# Patient Record
Sex: MALE | Race: WHITE
[De-identification: names, ages, dates, MRNs, and addresses within clinical notes are randomized per-mention and may not be internally consistent; named-entity substitution may affect disease eponyms.]

---

## 2021-03-01 ENCOUNTER — HOSPITAL ENCOUNTER (OUTPATIENT)
Dept: HOSPITAL 61 - KCIC MRI | Age: 61
End: 2021-03-01
Payer: COMMERCIAL

## 2021-03-01 DIAGNOSIS — M47.816: Primary | ICD-10-CM

## 2021-03-01 PROCEDURE — 72148 MRI LUMBAR SPINE W/O DYE: CPT

## 2021-03-01 NOTE — [facility]
EXAMINATION: Magnetic resonance imaging (MRI) of the lumbar spine without contrast 3/1/2021 8:02 AM



HISTORY: Lumbar pain and strain. Low back pain radiating upwards for one month.



TECHNIQUE: Multiplanar multi-weighted MRI of the lumbar spine was performed without intravenous contr
ast using the standard lumbar spine protocol.



Contrast information:

None administered.



COMPARISON: None available.



FINDINGS:



The alignment of the lumbar spine is normal.  There is a L4 osseous hemangioma. Vertebral bodies demo
nstrate normal signal intensity on all sequences.  There are no compression fractures.  The conus med
ullaris terminates at the level of L1. The distal spinal cord signal intensity is normal.  Interverte
bral disks have normal height and signal intensity. There are no annular fissures identified.  Limite
d views of the abdomen and pelvis show no soft tissue abnormality.  The aorta is normal. 



L1-L2: The disc is normal in configuration. There is no facet arthropathy. There is no neuroforaminal
 stenosis. There is no spinal canal stenosis.



L2-L3: The disc is normal in configuration. There is no facet arthropathy. There is no neuroforaminal
 stenosis. There is no spinal canal stenosis.



L3-L4: Mild disc bulge. There is no facet arthropathy. There is no neuroforaminal stenosis. There is 
no spinal canal stenosis.



L4-L5: The disc is normal in configuration. There is mild to moderate facet arthropathy. There is no 
neuroforaminal stenosis. There is no spinal canal stenosis.



L5-S1: Mild disc bulge with central annular fissure. There is mild facet arthropathy. There is no gordon
roforaminal stenosis. There is no spinal canal stenosis.



IMPRESSION:

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



Electronically signed by: Kristen Solis MD (3/1/2021 9:13 AM) [id number]